# Patient Record
Sex: FEMALE | Race: WHITE | Employment: UNEMPLOYED | URBAN - METROPOLITAN AREA
[De-identification: names, ages, dates, MRNs, and addresses within clinical notes are randomized per-mention and may not be internally consistent; named-entity substitution may affect disease eponyms.]

---

## 2020-10-13 ENCOUNTER — HOSPITAL ENCOUNTER (OUTPATIENT)
Dept: PREADMISSION TESTING | Age: 75
Discharge: HOME | End: 2020-10-13
Payer: COMMERCIAL

## 2020-10-13 PROCEDURE — 87635 SARS-COV-2 COVID-19 AMP PRB: CPT

## 2020-10-14 LAB — SARS-COV-2, COV2NT: NOT DETECTED

## 2020-10-15 ENCOUNTER — HOSPITAL ENCOUNTER (OUTPATIENT)
Age: 75
Setting detail: OUTPATIENT SURGERY
Discharge: HOME | End: 2020-10-15
Attending: UROLOGY | Admitting: UROLOGY
Payer: COMMERCIAL

## 2020-10-15 VITALS
DIASTOLIC BLOOD PRESSURE: 87 MMHG | TEMPERATURE: 99.1 F | HEART RATE: 88 BPM | OXYGEN SATURATION: 98 % | SYSTOLIC BLOOD PRESSURE: 146 MMHG | HEIGHT: 60 IN | BODY MASS INDEX: 31.72 KG/M2 | WEIGHT: 161.56 LBS | RESPIRATION RATE: 14 BRPM

## 2020-10-15 DIAGNOSIS — N20.1 RIGHT URETERAL STONE: Primary | ICD-10-CM

## 2020-10-15 PROCEDURE — 77030020782 HC GWN BAIR PAWS FLX 3M -B: Performed by: UROLOGY

## 2020-10-15 PROCEDURE — 2709999900 HC NON-CHARGEABLE SUPPLY: Performed by: UROLOGY

## 2020-10-15 PROCEDURE — 76210000021 HC REC RM PH II 0.5 TO 1 HR: Performed by: UROLOGY

## 2020-10-15 PROCEDURE — 74011250636 HC RX REV CODE- 250/636: Performed by: UROLOGY

## 2020-10-15 PROCEDURE — 77030040361 HC SLV COMPR DVT MDII -B: Performed by: UROLOGY

## 2020-10-15 PROCEDURE — 50590 FRAGMENTING OF KIDNEY STONE: CPT | Performed by: UROLOGY

## 2020-10-15 PROCEDURE — 99153 MOD SED SAME PHYS/QHP EA: CPT

## 2020-10-15 PROCEDURE — 99152 MOD SED SAME PHYS/QHP 5/>YRS: CPT

## 2020-10-15 RX ORDER — ONDANSETRON 2 MG/ML
4 INJECTION INTRAMUSCULAR; INTRAVENOUS ONCE
Status: COMPLETED | OUTPATIENT
Start: 2020-10-15 | End: 2020-10-15

## 2020-10-15 RX ORDER — FLUMAZENIL 0.1 MG/ML
0.5 INJECTION INTRAVENOUS AS NEEDED
Status: DISCONTINUED | OUTPATIENT
Start: 2020-10-15 | End: 2020-10-15 | Stop reason: HOSPADM

## 2020-10-15 RX ORDER — NALOXONE HYDROCHLORIDE 1 MG/ML
1 INJECTION INTRAMUSCULAR; INTRAVENOUS; SUBCUTANEOUS AS NEEDED
Status: DISCONTINUED | OUTPATIENT
Start: 2020-10-15 | End: 2020-10-15 | Stop reason: HOSPADM

## 2020-10-15 RX ORDER — OXYCODONE AND ACETAMINOPHEN 5; 325 MG/1; MG/1
2 TABLET ORAL
Status: CANCELLED | OUTPATIENT
Start: 2020-10-15

## 2020-10-15 RX ORDER — FENTANYL CITRATE 50 UG/ML
300 INJECTION, SOLUTION INTRAMUSCULAR; INTRAVENOUS AS NEEDED
Status: DISCONTINUED | OUTPATIENT
Start: 2020-10-15 | End: 2020-10-15 | Stop reason: HOSPADM

## 2020-10-15 RX ORDER — SODIUM CHLORIDE, SODIUM LACTATE, POTASSIUM CHLORIDE, CALCIUM CHLORIDE 600; 310; 30; 20 MG/100ML; MG/100ML; MG/100ML; MG/100ML
125 INJECTION, SOLUTION INTRAVENOUS CONTINUOUS
Status: CANCELLED | OUTPATIENT
Start: 2020-10-15

## 2020-10-15 RX ORDER — MIDAZOLAM HYDROCHLORIDE 5 MG/ML
6 INJECTION, SOLUTION INTRAMUSCULAR; INTRAVENOUS AS NEEDED
Status: DISCONTINUED | OUTPATIENT
Start: 2020-10-15 | End: 2020-10-15 | Stop reason: HOSPADM

## 2020-10-15 RX ORDER — HYDROCODONE BITARTRATE AND ACETAMINOPHEN 5; 325 MG/1; MG/1
1 TABLET ORAL
Qty: 10 TAB | Refills: 0 | Status: SHIPPED | OUTPATIENT
Start: 2020-10-15 | End: 2020-10-20

## 2020-10-15 RX ORDER — CEFAZOLIN SODIUM/WATER 2 G/20 ML
2 SYRINGE (ML) INTRAVENOUS ONCE
Status: COMPLETED | OUTPATIENT
Start: 2020-10-15 | End: 2020-10-15

## 2020-10-15 RX ORDER — SODIUM CHLORIDE, SODIUM LACTATE, POTASSIUM CHLORIDE, CALCIUM CHLORIDE 600; 310; 30; 20 MG/100ML; MG/100ML; MG/100ML; MG/100ML
125 INJECTION, SOLUTION INTRAVENOUS CONTINUOUS
Status: DISCONTINUED | OUTPATIENT
Start: 2020-10-15 | End: 2020-10-15 | Stop reason: HOSPADM

## 2020-10-15 RX ORDER — OXYCODONE AND ACETAMINOPHEN 5; 325 MG/1; MG/1
1 TABLET ORAL
Status: CANCELLED | OUTPATIENT
Start: 2020-10-15

## 2020-10-15 RX ORDER — ASPIRIN 81 MG/1
81 TABLET ORAL
COMMUNITY

## 2020-10-15 RX ADMIN — SODIUM CHLORIDE, SODIUM LACTATE, POTASSIUM CHLORIDE, AND CALCIUM CHLORIDE 125 ML/HR: 600; 310; 30; 20 INJECTION, SOLUTION INTRAVENOUS at 15:52

## 2020-10-15 RX ADMIN — ONDANSETRON 4 MG: 2 INJECTION INTRAMUSCULAR; INTRAVENOUS at 16:51

## 2020-10-15 NOTE — DISCHARGE INSTRUCTIONS
2 [facility], Urology     [facility], [address], [address] [place], [place]  Office: [phone]  Fax:    [phone]    PROCEDURE   Right ureteral ESWL    __  NOTIFY [facility] UROLOGY IMMEDIATELY IF ANY OF THE FOLLOWING OCCUR:     You are unable to urinate. Urgency to urinate is not uncommon.   You find yourself urinating small frequent amounts associated with severe lower abdominal discomfort.   Bright red blood with clots in the urine. Some reddish urine is not uncommon and should be treated with increasing the amount of fluids you drink.   Temperature above 101.5° and / or chills.   You are nauseous and / or vomiting and you cannot hold down any fluids.   Your pain is not controlled with the pain medication prescribed. SPECIAL CONSIDERATIONS:      Do not drive for at least 24 hours after the procedure and until you are no longer taking narcotic pain medication and you are able to move and react without hesitation.  You may return to work in 24-48 hours           _x_  Strain all urine and bring all stone fragments to your follow up visit. Use tylenol 500-1000mg every 6-8hrs as needed for pain if you don't want to use the norco for pain control.  _x_  No heavy lifting over 10 pounds & no strenuous exercise for 1 week     _x_  Our office will call you to make an appointment in 2-3 weeks. Please contact [facility], [facility]. Urology at [phone] or go to the nearest Emergency Department / Urgent Care facility for any other medical questions or concerns.       DISCHARGE SUMMARY from Nurse    PATIENT INSTRUCTIONS:    After general anesthesia or intravenous sedation, for 24 hours or while taking prescription Narcotics:  · Limit your activities  · Do not drive and operate hazardous machinery  · Do not make important personal or business decisions  · Do  not drink alcoholic beverages  · If you have not urinated within 8 hours after discharge, please contact your surgeon on call. Report the following to your surgeon:  · Excessive pain, swelling, redness or odor of or around the surgical area  · Temperature over 100.5  · Nausea and vomiting lasting longer than 4 hours or if unable to take medications  · Any signs of decreased circulation or nerve impairment to extremity: change in color, persistent  numbness, tingling, coldness or increase pain  · Any questions    What to do at Home:  Recommended activity: Activity as tolerated and no driving for today,     If you experience any of the following symptoms as noted above, please follow up with Dr. Mitch Elizalde. *  Please give a list of your current medications to your Primary Care Provider. *  Please update this list whenever your medications are discontinued, doses are      changed, or new medications (including over-the-counter products) are added. *  Please carry medication information at all times in case of emergency situations. These are general instructions for a healthy lifestyle:    No smoking/ No tobacco products/ Avoid exposure to second hand smoke  Surgeon General's Warning:  Quitting smoking now greatly reduces serious risk to your health. Obesity, smoking, and sedentary lifestyle greatly increases your risk for illness    A healthy diet, regular physical exercise & weight monitoring are important for maintaining a healthy lifestyle    You may be retaining fluid if you have a history of heart failure or if you experience any of the following symptoms:  Weight gain of 3 pounds or more overnight or 5 pounds in a week, increased swelling in our hands or feet or shortness of breath while lying flat in bed. Please call your doctor as soon as you notice any of these symptoms; do not wait until your next office visit. The discharge information has been reviewed with the patient and caregiver. The patient and caregiver verbalized understanding.   Discharge medications reviewed with the patient and caregiver and appropriate educational materials and side effects teaching were provided. ___________________________________________________________________________________________________________________________________    Patient armband removed and shredded         10 Things to Do When You Have COVID-19    Stay home. Don't go to school, work, or public areas. And don't use public transportation, ride-shares, or taxis unless you have no choice. Leave your home only if you need to get medical care. But call the doctor's office first so they know you're coming. And wear a cloth face cover. Ask before leaving isolation. Talk with your doctor or other health professional about when it will be safe for you to leave isolation. Wear a cloth face cover when you are around other people. It can help stop the spread of the virus when you cough or sneeze. Limit contact with people in your home. If possible, stay in a separate bedroom and use a separate bathroom. Avoid contact with pets and other animals. If possible, have a friend or family member care for them while you're sick. Cover your mouth and nose with a tissue when you cough or sneeze. Then throw the tissue in the trash right away. Wash your hands often, especially after you cough or sneeze. Use soap and water, and scrub for at least 20 seconds. If soap and water aren't available, use an alcohol-based hand . Don't share personal household items. These include bedding, towels, cups and glasses, and eating utensils. Clean and disinfect your home every day. Use household  or disinfectant wipes or sprays. Take special care to clean things that you grab with your hands. These include doorknobs, remote controls, phones, and handles on your refrigerator and microwave. And don't forget countertops, tabletops, bathrooms, and computer keyboards. Take acetaminophen (Tylenol) to relieve fever and body aches. Read and follow all instructions on the label. Current as of: July 10, 2020               Content Version: 12.6  © 7964-6472 [employer]. Care instructions adapted under license by [employer] (which disclaims liability or warranty for this information). If you have questions about a medical condition or this instruction, always ask your healthcare professional. [place] [address] any warranty or liability for your use of this information.

## 2020-10-15 NOTE — H&P
Update History & Physical    The Patient's History and Physical of October 12/2020, was reviewed with the patient and I examined the patient. There was no change. The surgical site was confirmed by the patient and me. Plan:  The risk, benefits, expected outcome, and alternative to the recommended procedure have been discussed with the patient. Patient understands and wants to proceed with the procedure. Electronically signed by Saúl Andujar MD on 10/15/2020 at 4:23 PM      [facility] [address] [address]  Tel: [phone]  Fax: [phone]      Patient: Catalina Tijerina  YOB: 1945  Date: 10/12/2020 10:15 AM   Visit Type: Consult        Assessment/Plan  # Detail Type Description    1. Assessment Kidney stone (N20.0). Patient Plan Patient with kidney stone incidentally found during a CT scan evaluation for incarcerated hernia. Kidney stone 5 x 6 mm right proximal ureteral stone hydronephrosis. I will get a KUB done today of the stone can not be visualized will likely plan for ESWL right side. Right ureteral ESWL. All questions were discussed with the patient and she understands. KUB 10/12/20 shows the stone. She will need right ureteral ESWL. Will schedule. Plan Orders The patient had the following order(s) completed today: Abdominal/KUB 1 View, Next Lab Date is 10/12/2020. Obtained on 10/12/2020, Interpretation: See module. This 69year old female presents for Kidney and/or Ureteral Stone. History of Present Illness:  1. Kidney and/or Ureteral Stone   Onset was sudden. It occurs intermittently. The problem is with no change. The pain does not radiate. There is no prior history of stones. Recent ER visit on 09/23/2020. No prior pertinent history. There are no aggravating factors. The patient lists no relieving factors.  Pertinent negatives include chills, constipation, diarrhea, fever, nausea, dribbling (urinary), frequency (urinary), urinary straining and vomiting. Additional information: incidental findings of rt 5 x 6 mm rt prox ureteral with mild/mod hydro. pain has no pain. Denies any urinary issues. Pt was in the ER for rt inguinal hernia. Urine pH 6 creatinine normal, positive UTI patient was treated with antibiotics from the ER. Diogenes Padron PAST MEDICAL/SURGICAL HISTORY   (Detailed)    Disease/disorder Onset Date Management Date Comments     rectal surgery 2014    Cancer, colon   1996      colon cancer sergery 1996      Hysterectomy, total, 1995      gallbladder surgery     Allergies, environmental              Pregnancy-full term  Full term     Pregnancy-full term  Full term       GYNECOLOGIC HISTORY:  Patient is postmenopausal.   Postmenopausal age: 53. Type of menopause is total hysterectomy . PROBLEM LIST:   Problem List reviewed. Problem Description Onset Date Chronic Clinical Status Notes   History of partial hysterectomy  N     History of colon cancer  N           Medications (active prior to today)  Medication Name Sig Description Start Date Stop Date Refilled Rx Elsewhere   folic acid 267 mcg tablet take 1 tablet by oral route  every day 04/03/2018   N   magnesium citrate 125 mg capsule  04/03/2018   N   potassium 99 mg tablet take 4 by Oral route daily 04/03/2018   N   Co Q-10 300 mg capsule  04/03/2018   N   Vitamin C 500 mg tablet take 1 tablet by oral route  every day 04/03/2018   N   multivitamin tablet take 1 tablet by oral route  every day with food 10/15/2019   N   Flomax 0.4 mg capsule take 1 capsule by oral route  every day 1/2 hour following the same meal each day 09/28/2020   N     Medication Reconciliation  Medications reconciled today. Allergies  Ingredient Reaction (Severity) Medication Name Comment   CODEINE cant sleep     LEVOFLOXACIN hives Levaquin    TETANUS AND DIPHTHERIA TOXOIDS, ADSORBED, ADULT  Decavac (PF)      Reviewed, no changes.   Family History (Detailed)  Relationship Family Member Name  Age at Death Condition Onset Age Cause of Death   Brother    Diabetes mellitus type 2  N   Father    Emphysema  N   Father    Congestive heart failure  Y   Father    Diabetes mellitus type 2  N   Mother    Multiple myeloma  Y   Paternal grandmother    Diabetes mellitus type 2  N         Social History:  (Detailed)  The patient is right-handed. Preferred language is [place]. MARITAL STATUS/FAMILY/SOCIAL SUPPORT  Marital status:    Smoking status: Never smoker. TOBACCO SCREENING:  Patient has never used tobacco. Patient has not used tobacco in the last 30 days. Patient has not used smokeless tobacco in the last 30 days. SMOKING STATUS  Type Smoking Status Usage Per Day Years Used Pack Years Total Pack Years    Never smoker         ALCOHOL  There is a history of alcohol use. Type: Wine. 1 drink consumed yearly. Last alcoholic drink was 3months ago. CAFFEINE  The patient uses caffeine: coffee and tea/soda. - 3 cups a day. LIFESTYLE  Sedentary activity level. 3.                    Review of Systems  System Neg/Pos Details   Constitutional Negative Chills and Fever. ENMT Negative Ear infections and Sore throat. Eyes Negative Blurred vision, Double vision and Eye pain. Respiratory Negative Asthma, Chronic cough, Dyspnea and Wheezing. Cardio Negative Chest pain. GI Negative Constipation, Decreased appetite, Diarrhea, Nausea and Vomiting.  Negative Urinary dribbling, Urinary frequency and Urinary straining. Endocrine Negative Cold intolerance, Heat intolerance, Increased thirst and Weight loss. Neuro Negative Headache and Tremors. Psych Negative Anxiety and Depression. Integumentary Negative Itching skin and Rash. MS Negative Back pain and Joint pain. Hema/Lymph Negative Easy bleeding. Reproductive Positive The patient is post-menopausal (Occurred at age 53. The menopause was total hysterectomy).      Vital Signs Gynecologic History  Patient is postmenopausal.    Height  Time ft in cm Last Measured Height Position   10:39 AM 5.0 1.00 154.94 04/03/2018 Standing   Weight/BSA/BMI  Time lb oz kg Context BMI kg/m2 BSA m2   10:39 .80  74.298  30.95    Blood Pressure  Time BP mm/Hg Position Side Site Method Cuff Size   10:39 /84        Temperature/Pulse/Respiration  Time Temp F Temp C Temp Site Pulse/min Pattern Resp/ min   10:39 AM 97.60 36.44  82     Measured By  Time Measured by   10:39 AM Ada Covert     Physical Exam  Exam Findings Details   Constitutional Normal Well developed. Head/Face Normal Facial features - Normal.   Nose/Mouth/Throat Normal Tongue - Normal. Buccal mucosa - Normal.   Neck Exam Normal Inspection - Normal. Palpation - Normal. Thyroid gland - Normal.   Lymph Detail Normal Submandibular. Supraclavicular. Respiratory Normal Effort - Normal.   Abdomen Normal No abdominal tenderness. No hepatic enlargement. No spleen enlargement. No hernia. Genitourinary Normal No Suprapubic tenderness. No CVA tenderness. No Flank mass   Skin Normal Inspection - Normal.   Musculoskeletal Normal Visual overview of all four extremities is normal. Gait - Normal.   Extremity Normal No Edema. No Calf tenderness. Neurological Normal Level of consciousness - Normal. Orientation - Normal. Memory - Normal.   Psychiatric Normal Orientation - Oriented to time, place, person & situation. Appropriate mood and affect.          Medications (added, continued, or stopped today)  Start Date Medication Directions PRN Status PRN Reason Instruction Stop Date   04/03/2018 Co Q-10 300 mg capsule  N      09/28/2020 Flomax 0.4 mg capsule take 1 capsule by oral route  every day 1/2 hour following the same meal each day N      32/97/3661 folic acid 356 mcg tablet take 1 tablet by oral route  every day N      04/03/2018 magnesium citrate 125 mg capsule  N      10/15/2019 multivitamin tablet take 1 tablet by oral route  every day with food N      04/03/2018 potassium 99 mg tablet take 4 by Oral route daily N      04/03/2018 Vitamin C 500 mg tablet take 1 tablet by oral route  every day N      Active Patient Care Team Members    Name Contact Agency Type Support Role Relationship Active Date Inactive Date Specialty   Darius Linares   Patient provider PCP   Family Practice       Provider: Cortes Armstrong MD 10/12/2020 10:15 AM  Document generated by:  Ras Deleon 10/13/2020 07:38 AM        Electronically signed by Cortes Armstrong MD on 10/13/2020 07:53 AM

## 2020-11-09 ENCOUNTER — HOSPITAL ENCOUNTER (OUTPATIENT)
Dept: LAB | Age: 75
Discharge: HOME | End: 2020-11-09
Payer: COMMERCIAL

## 2020-11-09 LAB
HCT VFR BLD AUTO: 40.9 % (ref 35–45)
HGB BLD-MCNC: 13.5 G/DL (ref 12–16)
POTASSIUM SERPL-SCNC: 3.6 MMOL/L (ref 3.5–5.5)

## 2020-11-09 PROCEDURE — 85018 HEMOGLOBIN: CPT

## 2020-11-09 PROCEDURE — 84132 ASSAY OF SERUM POTASSIUM: CPT

## 2020-11-09 PROCEDURE — 36415 COLL VENOUS BLD VENIPUNCTURE: CPT

## 2020-11-16 ENCOUNTER — HOSPITAL ENCOUNTER (OUTPATIENT)
Dept: LAB | Age: 75
Discharge: HOME | End: 2020-11-16
Payer: COMMERCIAL

## 2020-11-16 PROCEDURE — 88302 TISSUE EXAM BY PATHOLOGIST: CPT

## (undated) DEVICE — AIRLIFE™ NASAL OXYGEN CANNULA CURVED, FLARED TIP WITH 14 FOOT (4.3 M) CRUSH-RESISTANT TUBING, OVER-THE-EAR STYLE: Brand: AIRLIFE™

## (undated) DEVICE — GARMENT,MEDLINE,DVT,INT,CALF,MED, GEN2: Brand: MEDLINE

## (undated) DEVICE — STRAP,POSITIONING,KNEE/BODY,FOAM,4X60": Brand: MEDLINE